# Patient Record
Sex: FEMALE | Race: WHITE | NOT HISPANIC OR LATINO | Employment: UNEMPLOYED | URBAN - METROPOLITAN AREA
[De-identification: names, ages, dates, MRNs, and addresses within clinical notes are randomized per-mention and may not be internally consistent; named-entity substitution may affect disease eponyms.]

---

## 2022-08-02 ENCOUNTER — OFFICE VISIT (OUTPATIENT)
Dept: CARDIOLOGY | Facility: CLINIC | Age: 67
End: 2022-08-02

## 2022-08-02 VITALS
SYSTOLIC BLOOD PRESSURE: 178 MMHG | OXYGEN SATURATION: 99 % | HEIGHT: 62 IN | HEART RATE: 69 BPM | BODY MASS INDEX: 18.77 KG/M2 | DIASTOLIC BLOOD PRESSURE: 104 MMHG | WEIGHT: 102 LBS

## 2022-08-02 DIAGNOSIS — R42 LIGHTHEADEDNESS: ICD-10-CM

## 2022-08-02 DIAGNOSIS — R03.0 ELEVATED BP WITHOUT DIAGNOSIS OF HYPERTENSION: ICD-10-CM

## 2022-08-02 DIAGNOSIS — R00.2 PALPITATIONS: Primary | ICD-10-CM

## 2022-08-02 DIAGNOSIS — E78.49 OTHER HYPERLIPIDEMIA: ICD-10-CM

## 2022-08-02 DIAGNOSIS — I65.23 BILATERAL CAROTID ARTERY STENOSIS: ICD-10-CM

## 2022-08-02 PROCEDURE — 99204 OFFICE O/P NEW MOD 45 MIN: CPT | Performed by: INTERNAL MEDICINE

## 2022-08-02 PROCEDURE — 93000 ELECTROCARDIOGRAM COMPLETE: CPT | Performed by: INTERNAL MEDICINE

## 2022-08-02 NOTE — PROGRESS NOTES
Subjective:     Encounter Date:08/02/2022      Patient ID: Isabelle Xavier is a 67 y.o. female.    Chief Complaint:  History of Present Illness    This is a 67-year-old with hyperlipidemia, migraines, carotid artery stenosis, who presents for evaluation of palpitations.    Patient indicates that she has had issues with migraines for about 30 years.  She has been tried on various medications especially in the last couple of months.  Unfortunately she has difficulty tolerating many of the medications.  With the medication she attempted over the last month she developed symptoms of lower extremity pain that occurs both at rest and with activity, palpitations associated with lightheadedness.  She continues to have the leg discomfort.  Palpitations have improved a little bit since she stopped the medications about a month ago.  She does describe some mild chest tightness that is not limiting and she does not find too concerning.     As part of her work-up for her symptoms SHANT Subramanian recommended evaluation with lower extremity ABIs and a carotid Doppler ultrasound.  Her carotid Doppler ultrasound showed 50 to 69% stenosis involving the mid to distal segments of the right internal carotid artery and distal segment of the left internal carotid artery.  Her ABIs were normal.  She was evaluated by vascular surgery yesterday who recommended starting aspirin 81 mg in addition to continuing statin therapy.  However in light of moderate carotid artery stenosis and lack of neurologic symptoms he did not recommend any further intervention at this time.    She denies any issues with blood pressures normally.  She denies any prior cardiac work-up.  She denies any near-syncope or syncope or lower extremity edema.    Review of Systems   Constitutional: Positive for malaise/fatigue.   HENT: Negative for hearing loss, hoarse voice, nosebleeds and sore throat.    Eyes: Negative for pain.   Cardiovascular: Positive for chest pain,  "dyspnea on exertion and palpitations. Negative for claudication, cyanosis, irregular heartbeat, leg swelling, near-syncope, orthopnea, paroxysmal nocturnal dyspnea and syncope.   Respiratory: Negative for shortness of breath and snoring.    Endocrine: Negative for cold intolerance, heat intolerance, polydipsia, polyphagia and polyuria.   Skin: Negative for itching and rash.   Musculoskeletal: Positive for myalgias. Negative for arthritis, falls, joint pain, joint swelling, muscle cramps and muscle weakness.   Gastrointestinal: Negative for constipation, diarrhea, dysphagia, heartburn, hematemesis, hematochezia, melena, nausea and vomiting.   Genitourinary: Negative for frequency, hematuria and hesitancy.   Neurological: Positive for headaches and light-headedness. Negative for excessive daytime sleepiness, dizziness, numbness and weakness.   Psychiatric/Behavioral: Negative for depression. The patient is not nervous/anxious.        No current outpatient medications on file.    Past Medical History:   Diagnosis Date   • Anemia    • Migraine without aura     stated in 2- Dr. Mikaela Maradiaga office note       Past Surgical History:   Procedure Laterality Date   • TUBAL ABDOMINAL LIGATION         Family History   Problem Relation Age of Onset   • Hypertension Mother    • Breast cancer Sister    • Breast cancer Maternal Aunt        Social History     Tobacco Use   • Smoking status: Never Smoker   • Smokeless tobacco: Never Used   • Tobacco comment: no caffeine   Substance Use Topics   • Alcohol use: Never   • Drug use: Never         ECG 12 Lead    Date/Time: 8/2/2022 5:47 PM  Performed by: Racheal Osei MD  Authorized by: Racheal Osei MD   Comparison: not compared with previous ECG   Previous ECG: no previous ECG available  Rhythm: sinus rhythm               Objective:     Visit Vitals  BP (!) 178/104 (BP Location: Right arm, Patient Position: Sitting, Cuff Size: Adult)   Pulse 69   Ht 157.5 cm (62\")   Wt 46.3 " kg (102 lb)   SpO2 99%   BMI 18.66 kg/m²         Constitutional:       Appearance: Normal appearance. Well-developed.   Eyes:      General: Lids are normal.      Conjunctiva/sclera: Conjunctivae normal.      Pupils: Pupils are equal, round, and reactive to light.   HENT:      Head: Normocephalic and atraumatic.   Neck:      Vascular: No carotid bruit or JVD.      Lymphadenopathy: No cervical adenopathy.   Pulmonary:      Effort: Pulmonary effort is normal.      Breath sounds: Normal breath sounds.   Cardiovascular:      Normal rate. Regular rhythm.      No gallop.   Pulses:     Radial: 2+ bilaterally.  Edema:     Peripheral edema absent.   Abdominal:      Palpations: Abdomen is soft.   Musculoskeletal:      Cervical back: Full passive range of motion without pain, normal range of motion and neck supple. Skin:     General: Skin is warm and dry.   Neurological:      Mental Status: Alert and oriented to person, place, and time.             Assessment:          Diagnosis Plan   1. Palpitations  Holter Monitor - 24 Hour    Adult Transthoracic Echo Complete w/ Color, Spectral and Contrast if Necessary Per Protocol   2. Lightheadedness  Adult Transthoracic Echo Complete w/ Color, Spectral and Contrast if Necessary Per Protocol   3. Bilateral carotid artery stenosis     4. Other hyperlipidemia     5. Elevated BP without diagnosis of hypertension            Plan:       1.  Palpitations.  Associated with lightheadedness.  We will proceed with a 24-hour Holter monitor and an echocardiogram.  2.  Chest tightness.  Mild symptoms that the patient does not find too concerning.  Will monitor for now.  3.  Bilateral carotid artery stenosis.  In the moderate range.  Plans for medical management.  Agree with the addition of aspirin along with continuing statin therapy.    4.  Hyperlipidemia.  On rosuvastatin.  In light of her carotid artery disease would be more aggressive with her LDL goal and lower this to 70 or below.  5.  Elevated  blood pressure.  The patient reports that her blood pressures are normally well controlled.  We will monitor for now.    I will call and discussed the results of her echocardiogram and her Holter monitor and determine further recommendations based on those results.

## 2022-08-03 RX ORDER — ROSUVASTATIN CALCIUM 10 MG/1
10 TABLET, COATED ORAL DAILY
COMMUNITY
Start: 2022-07-05 | End: 2023-01-01

## 2022-08-03 RX ORDER — ZOLMITRIPTAN 5 MG/1
5 TABLET, FILM COATED ORAL AS NEEDED
COMMUNITY
Start: 1992-01-01 | End: 2023-02-24

## 2022-08-03 RX ORDER — DOXYCYCLINE HYCLATE 50 MG/1
324 CAPSULE, GELATIN COATED ORAL DAILY
COMMUNITY
Start: 2022-07-05 | End: 2023-07-05

## 2022-08-08 ENCOUNTER — TELEPHONE (OUTPATIENT)
Dept: CARDIOLOGY | Facility: CLINIC | Age: 67
End: 2022-08-08

## 2022-08-11 ENCOUNTER — HOSPITAL ENCOUNTER (OUTPATIENT)
Dept: CARDIOLOGY | Facility: HOSPITAL | Age: 67
Discharge: HOME | End: 2022-08-11
Admitting: INTERNAL MEDICINE

## 2022-08-11 VITALS
SYSTOLIC BLOOD PRESSURE: 150 MMHG | HEIGHT: 62 IN | BODY MASS INDEX: 18.77 KG/M2 | HEART RATE: 84 BPM | DIASTOLIC BLOOD PRESSURE: 82 MMHG | WEIGHT: 102 LBS

## 2022-08-11 DIAGNOSIS — R00.2 PALPITATIONS: ICD-10-CM

## 2022-08-11 DIAGNOSIS — R42 LIGHTHEADEDNESS: ICD-10-CM

## 2022-08-11 LAB
AORTIC ARCH: 1.7 CM
ASCENDING AORTA: 2 CM
BH CV ECHO MEAS - ACS: 1.77 CM
BH CV ECHO MEAS - AO MAX PG: 6.4 MMHG
BH CV ECHO MEAS - AO MEAN PG: 3.3 MMHG
BH CV ECHO MEAS - AO ROOT DIAM: 2.7 CM
BH CV ECHO MEAS - AO V2 MAX: 126.2 CM/SEC
BH CV ECHO MEAS - AO V2 VTI: 26.8 CM
BH CV ECHO MEAS - AVA(I,D): 1.31 CM2
BH CV ECHO MEAS - EDV(CUBED): 50.2 ML
BH CV ECHO MEAS - EDV(MOD-SP2): 55 ML
BH CV ECHO MEAS - EDV(MOD-SP4): 35 ML
BH CV ECHO MEAS - EF(MOD-BP): 69.7 %
BH CV ECHO MEAS - EF(MOD-SP2): 67.3 %
BH CV ECHO MEAS - EF(MOD-SP4): 68.6 %
BH CV ECHO MEAS - ESV(CUBED): 7.7 ML
BH CV ECHO MEAS - ESV(MOD-SP2): 18 ML
BH CV ECHO MEAS - ESV(MOD-SP4): 11 ML
BH CV ECHO MEAS - FS: 46.4 %
BH CV ECHO MEAS - IVS/LVPW: 1.07 CM
BH CV ECHO MEAS - IVSD: 0.85 CM
BH CV ECHO MEAS - LAT PEAK E' VEL: 12.6 CM/SEC
BH CV ECHO MEAS - LV DIASTOLIC VOL/BSA (35-75): 24.4 CM2
BH CV ECHO MEAS - LV MASS(C)D: 85 GRAMS
BH CV ECHO MEAS - LV MAX PG: 3.8 MMHG
BH CV ECHO MEAS - LV MEAN PG: 1.84 MMHG
BH CV ECHO MEAS - LV SYSTOLIC VOL/BSA (12-30): 7.7 CM2
BH CV ECHO MEAS - LV V1 MAX: 96.8 CM/SEC
BH CV ECHO MEAS - LV V1 VTI: 20.1 CM
BH CV ECHO MEAS - LVIDD: 3.7 CM
BH CV ECHO MEAS - LVIDS: 1.98 CM
BH CV ECHO MEAS - LVOT AREA: 1.75 CM2
BH CV ECHO MEAS - LVOT DIAM: 1.49 CM
BH CV ECHO MEAS - LVPWD: 0.8 CM
BH CV ECHO MEAS - MED PEAK E' VEL: 8.3 CM/SEC
BH CV ECHO MEAS - MR MAX PG: 33.1 MMHG
BH CV ECHO MEAS - MR MAX VEL: 287.8 CM/SEC
BH CV ECHO MEAS - MV A DUR: 0.11 SEC
BH CV ECHO MEAS - MV A MAX VEL: 64.3 CM/SEC
BH CV ECHO MEAS - MV DEC SLOPE: 396.3 CM/SEC2
BH CV ECHO MEAS - MV DEC TIME: 0.18 MSEC
BH CV ECHO MEAS - MV E MAX VEL: 92.1 CM/SEC
BH CV ECHO MEAS - MV E/A: 1.43
BH CV ECHO MEAS - MV MAX PG: 3.9 MMHG
BH CV ECHO MEAS - MV MEAN PG: 1.6 MMHG
BH CV ECHO MEAS - MV P1/2T: 66.2 MSEC
BH CV ECHO MEAS - MV V2 VTI: 25.2 CM
BH CV ECHO MEAS - MVA(P1/2T): 3.3 CM2
BH CV ECHO MEAS - MVA(VTI): 1.39 CM2
BH CV ECHO MEAS - PA ACC TIME: 0.13 SEC
BH CV ECHO MEAS - PA PR(ACCEL): 20.8 MMHG
BH CV ECHO MEAS - PA V2 MAX: 99.1 CM/SEC
BH CV ECHO MEAS - PI END-D VEL: 95 CM/SEC
BH CV ECHO MEAS - PULM A REVS DUR: 0.13 SEC
BH CV ECHO MEAS - PULM A REVS VEL: 24.4 CM/SEC
BH CV ECHO MEAS - PULM DIAS VEL: 36.4 CM/SEC
BH CV ECHO MEAS - PULM S/D: 1.88
BH CV ECHO MEAS - PULM SYS VEL: 68.6 CM/SEC
BH CV ECHO MEAS - QP/QS: 1.27
BH CV ECHO MEAS - RAP SYSTOLE: 3 MMHG
BH CV ECHO MEAS - RV MAX PG: 3.5 MMHG
BH CV ECHO MEAS - RV V1 MAX: 93.8 CM/SEC
BH CV ECHO MEAS - RV V1 VTI: 18.8 CM
BH CV ECHO MEAS - RVOT DIAM: 1.74 CM
BH CV ECHO MEAS - RVSP: 21 MMHG
BH CV ECHO MEAS - SI(MOD-SP2): 25.8 ML/M2
BH CV ECHO MEAS - SI(MOD-SP4): 16.7 ML/M2
BH CV ECHO MEAS - SUP REN AO DIAM: 2.3 CM
BH CV ECHO MEAS - SV(LVOT): 35.2 ML
BH CV ECHO MEAS - SV(MOD-SP2): 37 ML
BH CV ECHO MEAS - SV(MOD-SP4): 24 ML
BH CV ECHO MEAS - SV(RVOT): 44.6 ML
BH CV ECHO MEAS - TAPSE (>1.6): 2.5 CM
BH CV ECHO MEAS - TR MAX PG: 18.2 MMHG
BH CV ECHO MEAS - TR MAX VEL: 213.1 CM/SEC
BH CV ECHO MEASUREMENTS AVERAGE E/E' RATIO: 8.81
BH CV XLRA - RV BASE: 2.42 CM
BH CV XLRA - RV LENGTH: 6.2 CM
BH CV XLRA - RV MID: 2.6 CM
BH CV XLRA - TDI S': 12.4 CM/SEC
LEFT ATRIUM VOLUME INDEX: 26.5 ML/M2
LV EF 2D ECHO EST: 70 %
MAXIMAL PREDICTED HEART RATE: 155 BPM
SINUS: 2.7 CM
STJ: 2.6 CM
STRESS TARGET HR: 132 BPM

## 2022-08-11 PROCEDURE — 93306 TTE W/DOPPLER COMPLETE: CPT | Performed by: INTERNAL MEDICINE

## 2022-08-11 PROCEDURE — 93306 TTE W/DOPPLER COMPLETE: CPT
